# Patient Record
Sex: FEMALE
[De-identification: names, ages, dates, MRNs, and addresses within clinical notes are randomized per-mention and may not be internally consistent; named-entity substitution may affect disease eponyms.]

---

## 2021-02-09 ENCOUNTER — TELEPHONE (OUTPATIENT)
Dept: SCHEDULING | Age: 36
End: 2021-02-09

## 2021-02-11 ENCOUNTER — OFFICE VISIT (OUTPATIENT)
Dept: SPORTS MEDICINE | Age: 36
End: 2021-02-11

## 2021-02-11 VITALS
SYSTOLIC BLOOD PRESSURE: 116 MMHG | HEIGHT: 65 IN | DIASTOLIC BLOOD PRESSURE: 74 MMHG | WEIGHT: 199 LBS | HEART RATE: 70 BPM | BODY MASS INDEX: 33.15 KG/M2

## 2021-02-11 DIAGNOSIS — M25.562 CHRONIC PAIN OF LEFT KNEE: Primary | ICD-10-CM

## 2021-02-11 DIAGNOSIS — G89.29 CHRONIC PAIN OF LEFT KNEE: Primary | ICD-10-CM

## 2021-02-11 PROCEDURE — 99243 OFF/OP CNSLTJ NEW/EST LOW 30: CPT | Performed by: FAMILY MEDICINE

## 2021-02-11 PROCEDURE — 97110 THERAPEUTIC EXERCISES: CPT | Performed by: FAMILY MEDICINE

## 2021-02-11 ASSESSMENT — ENCOUNTER SYMPTOMS
FEVER: 0
CHILLS: 0
COLOR CHANGE: 0
WEAKNESS: 0
NUMBNESS: 0

## 2021-02-16 ENCOUNTER — E-ADVICE (OUTPATIENT)
Dept: SPORTS MEDICINE | Age: 36
End: 2021-02-16

## 2021-02-16 DIAGNOSIS — G89.29 CHRONIC PAIN OF LEFT KNEE: Primary | ICD-10-CM

## 2021-02-16 DIAGNOSIS — M25.562 CHRONIC PAIN OF LEFT KNEE: Primary | ICD-10-CM

## 2021-02-16 DIAGNOSIS — M22.2X2 PATELLOFEMORAL PAIN SYNDROME OF LEFT KNEE: ICD-10-CM

## 2023-04-03 PROBLEM — E78.2 MIXED HYPERLIPIDEMIA: Status: ACTIVE | Noted: 2023-04-03

## 2023-04-03 PROBLEM — K21.9 GASTROESOPHAGEAL REFLUX DISEASE WITHOUT ESOPHAGITIS: Status: ACTIVE | Noted: 2023-04-03

## 2023-04-03 PROBLEM — A60.00 HERPES SIMPLEX INFECTION OF GENITOURINARY SYSTEM: Status: ACTIVE | Noted: 2023-04-03

## 2023-04-03 PROBLEM — H10.13 ALLERGIC CONJUNCTIVITIS OF BOTH EYES: Status: ACTIVE | Noted: 2023-04-03

## 2023-04-20 PROBLEM — A60.00 GENITAL HERPES SIMPLEX: Status: ACTIVE | Noted: 2023-04-20

## 2023-04-20 PROBLEM — E78.2 MIXED HYPERLIPIDEMIA: Status: ACTIVE | Noted: 2023-04-20

## 2023-04-20 PROBLEM — H10.13 ALLERGIC CONJUNCTIVITIS OF BOTH EYES: Status: ACTIVE | Noted: 2023-04-20

## 2023-04-20 PROBLEM — G89.29 CHRONIC PAIN OF LEFT KNEE: Status: RESOLVED | Noted: 2021-02-11 | Resolved: 2023-04-20

## 2023-04-20 PROBLEM — M25.562 CHRONIC PAIN OF LEFT KNEE: Status: RESOLVED | Noted: 2021-02-11 | Resolved: 2023-04-20

## 2024-09-19 ENCOUNTER — LAB ENCOUNTER (OUTPATIENT)
Dept: LAB | Age: 39
End: 2024-09-19
Attending: FAMILY MEDICINE
Payer: COMMERCIAL

## 2024-09-19 ENCOUNTER — OFFICE VISIT (OUTPATIENT)
Facility: CLINIC | Age: 39
End: 2024-09-19
Payer: COMMERCIAL

## 2024-09-19 VITALS
HEIGHT: 65 IN | DIASTOLIC BLOOD PRESSURE: 76 MMHG | OXYGEN SATURATION: 98 % | BODY MASS INDEX: 34.16 KG/M2 | WEIGHT: 205 LBS | SYSTOLIC BLOOD PRESSURE: 118 MMHG | HEART RATE: 81 BPM

## 2024-09-19 DIAGNOSIS — R79.89 LOW VITAMIN D LEVEL: ICD-10-CM

## 2024-09-19 DIAGNOSIS — Z00.00 ENCOUNTER FOR GENERAL ADULT MEDICAL EXAMINATION W/O ABNORMAL FINDINGS: Primary | ICD-10-CM

## 2024-09-19 DIAGNOSIS — A60.04 HERPES SIMPLEX VULVOVAGINITIS: ICD-10-CM

## 2024-09-19 DIAGNOSIS — K21.9 GASTROESOPHAGEAL REFLUX DISEASE WITHOUT ESOPHAGITIS: ICD-10-CM

## 2024-09-19 DIAGNOSIS — R10.2 PELVIC PAIN: ICD-10-CM

## 2024-09-19 DIAGNOSIS — Z00.00 ENCOUNTER FOR GENERAL ADULT MEDICAL EXAMINATION W/O ABNORMAL FINDINGS: ICD-10-CM

## 2024-09-19 DIAGNOSIS — N83.201 CYSTS OF BOTH OVARIES: ICD-10-CM

## 2024-09-19 DIAGNOSIS — N83.202 CYSTS OF BOTH OVARIES: ICD-10-CM

## 2024-09-19 DIAGNOSIS — R21 RASH: ICD-10-CM

## 2024-09-19 DIAGNOSIS — Z23 NEED FOR VACCINATION: ICD-10-CM

## 2024-09-19 DIAGNOSIS — D75.89 MACROCYTOSIS: ICD-10-CM

## 2024-09-19 PROBLEM — A60.00 GENITAL HERPES SIMPLEX: Status: ACTIVE | Noted: 2023-04-20

## 2024-09-19 PROBLEM — H10.13 ALLERGIC CONJUNCTIVITIS OF BOTH EYES: Status: ACTIVE | Noted: 2023-04-03

## 2024-09-19 PROBLEM — E78.2 MIXED HYPERLIPIDEMIA: Status: RESOLVED | Noted: 2023-04-03 | Resolved: 2024-09-19

## 2024-09-19 PROBLEM — H10.13 ALLERGIC CONJUNCTIVITIS OF BOTH EYES: Status: RESOLVED | Noted: 2023-04-03 | Resolved: 2024-09-19

## 2024-09-19 PROBLEM — E78.2 MIXED HYPERLIPIDEMIA: Status: ACTIVE | Noted: 2023-04-03

## 2024-09-19 LAB
ALBUMIN SERPL-MCNC: 4.8 G/DL (ref 3.2–4.8)
ALBUMIN/GLOB SERPL: 1.5 {RATIO} (ref 1–2)
ALP LIVER SERPL-CCNC: 47 U/L
ALT SERPL-CCNC: 12 U/L
ANION GAP SERPL CALC-SCNC: 9 MMOL/L (ref 0–18)
AST SERPL-CCNC: 18 U/L (ref ?–34)
BASOPHILS # BLD AUTO: 0.04 X10(3) UL (ref 0–0.2)
BASOPHILS NFR BLD AUTO: 0.7 %
BILIRUB SERPL-MCNC: 0.4 MG/DL (ref 0.3–1.2)
BUN BLD-MCNC: 8 MG/DL (ref 9–23)
BUN/CREAT SERPL: 9.8 (ref 10–20)
CALCIUM BLD-MCNC: 9.7 MG/DL (ref 8.7–10.4)
CHLORIDE SERPL-SCNC: 106 MMOL/L (ref 98–112)
CHOLEST SERPL-MCNC: 249 MG/DL (ref ?–200)
CO2 SERPL-SCNC: 24 MMOL/L (ref 21–32)
CREAT BLD-MCNC: 0.82 MG/DL
DEPRECATED RDW RBC AUTO: 44.7 FL (ref 35.1–46.3)
EGFRCR SERPLBLD CKD-EPI 2021: 92 ML/MIN/1.73M2 (ref 60–?)
EOSINOPHIL # BLD AUTO: 0.17 X10(3) UL (ref 0–0.7)
EOSINOPHIL NFR BLD AUTO: 3 %
ERYTHROCYTE [DISTWIDTH] IN BLOOD BY AUTOMATED COUNT: 12 % (ref 11–15)
EST. AVERAGE GLUCOSE BLD GHB EST-MCNC: 94 MG/DL (ref 68–126)
FASTING PATIENT LIPID ANSWER: YES
FASTING STATUS PATIENT QL REPORTED: YES
GLOBULIN PLAS-MCNC: 3.2 G/DL (ref 2–3.5)
GLUCOSE BLD-MCNC: 82 MG/DL (ref 70–99)
HBA1C MFR BLD: 4.9 % (ref ?–5.7)
HCT VFR BLD AUTO: 42.2 %
HDLC SERPL-MCNC: 54 MG/DL (ref 40–59)
HGB BLD-MCNC: 14 G/DL
IMM GRANULOCYTES # BLD AUTO: 0 X10(3) UL (ref 0–1)
IMM GRANULOCYTES NFR BLD: 0 %
LDLC SERPL CALC-MCNC: 161 MG/DL (ref ?–100)
LYMPHOCYTES # BLD AUTO: 1.9 X10(3) UL (ref 1–4)
LYMPHOCYTES NFR BLD AUTO: 33.6 %
MCH RBC QN AUTO: 33.5 PG (ref 26–34)
MCHC RBC AUTO-ENTMCNC: 33.2 G/DL (ref 31–37)
MCV RBC AUTO: 101 FL
MONOCYTES # BLD AUTO: 0.4 X10(3) UL (ref 0.1–1)
MONOCYTES NFR BLD AUTO: 7.1 %
NEUTROPHILS # BLD AUTO: 3.14 X10 (3) UL (ref 1.5–7.7)
NEUTROPHILS # BLD AUTO: 3.14 X10(3) UL (ref 1.5–7.7)
NEUTROPHILS NFR BLD AUTO: 55.6 %
NONHDLC SERPL-MCNC: 195 MG/DL (ref ?–130)
OSMOLALITY SERPL CALC.SUM OF ELEC: 285 MOSM/KG (ref 275–295)
PLATELET # BLD AUTO: 351 10(3)UL (ref 150–450)
POTASSIUM SERPL-SCNC: 4.6 MMOL/L (ref 3.5–5.1)
PROT SERPL-MCNC: 8 G/DL (ref 5.7–8.2)
RBC # BLD AUTO: 4.18 X10(6)UL
SODIUM SERPL-SCNC: 139 MMOL/L (ref 136–145)
TRIGL SERPL-MCNC: 185 MG/DL (ref 30–149)
TSI SER-ACNC: 2.82 MIU/ML (ref 0.55–4.78)
VIT D+METAB SERPL-MCNC: 27.5 NG/ML (ref 30–100)
VLDLC SERPL CALC-MCNC: 36 MG/DL (ref 0–30)
WBC # BLD AUTO: 5.7 X10(3) UL (ref 4–11)

## 2024-09-19 PROCEDURE — 3008F BODY MASS INDEX DOCD: CPT | Performed by: FAMILY MEDICINE

## 2024-09-19 PROCEDURE — 82306 VITAMIN D 25 HYDROXY: CPT | Performed by: FAMILY MEDICINE

## 2024-09-19 PROCEDURE — 83036 HEMOGLOBIN GLYCOSYLATED A1C: CPT | Performed by: FAMILY MEDICINE

## 2024-09-19 PROCEDURE — 80061 LIPID PANEL: CPT | Performed by: FAMILY MEDICINE

## 2024-09-19 PROCEDURE — 3078F DIAST BP <80 MM HG: CPT | Performed by: FAMILY MEDICINE

## 2024-09-19 PROCEDURE — 99386 PREV VISIT NEW AGE 40-64: CPT | Performed by: FAMILY MEDICINE

## 2024-09-19 PROCEDURE — 99203 OFFICE O/P NEW LOW 30 MIN: CPT | Performed by: FAMILY MEDICINE

## 2024-09-19 PROCEDURE — 90715 TDAP VACCINE 7 YRS/> IM: CPT | Performed by: FAMILY MEDICINE

## 2024-09-19 PROCEDURE — 3074F SYST BP LT 130 MM HG: CPT | Performed by: FAMILY MEDICINE

## 2024-09-19 PROCEDURE — 90471 IMMUNIZATION ADMIN: CPT | Performed by: FAMILY MEDICINE

## 2024-09-19 PROCEDURE — 80050 GENERAL HEALTH PANEL: CPT | Performed by: FAMILY MEDICINE

## 2024-09-19 RX ORDER — LEVONORGESTREL / ETHINYL ESTRADIOL AND ETHINYL ESTRADIOL 150-30(84)
1 KIT ORAL EVERY 24 HOURS
COMMUNITY

## 2024-09-19 RX ORDER — PANTOPRAZOLE SODIUM 40 MG/1
40 TABLET, DELAYED RELEASE ORAL
COMMUNITY

## 2024-09-19 RX ORDER — VALACYCLOVIR HYDROCHLORIDE 500 MG/1
500 TABLET, FILM COATED ORAL DAILY
COMMUNITY

## 2024-09-19 RX ORDER — TRIAMCINOLONE ACETONIDE 1 MG/G
CREAM TOPICAL 2 TIMES DAILY PRN
Qty: 60 G | Refills: 0 | Status: SHIPPED | OUTPATIENT
Start: 2024-09-19

## 2024-09-19 NOTE — PROGRESS NOTES
Subjective:   Madalyn Dejesus is a 39 year old female who presents for Establish Care and Physical     Hx of vit D def. Patient was on RX strength vitamin D for about a month. Is now supplementing with OTC vit D. Patient does do resistance training.     Hx of of genital HSV on valtrex 500 mg daily for suppression. Periods are the major trigger for outbreaks. Since is going to be starting continuous OCPs to suppress period. Is wondering if can come off valtrex.     GERD on pantoprazole as needed. Rare use. Saw GI who did EGD in 2023 which was normal.     Rash  Has had on right side of abdomen for the past month. Slightly itchy    Right sided lower abdominal pain  Did have US about 2 years ago which demonstrated some ovarian cysts. Since then patient will have intermittent sharp pain that radiates from that area up to rest of thorax if moves in a certain way. More often triggered by laying down.     Follows with ob/gyn for pap smears. Is doing continuous OCPs    History/Other:    Chief Complaint Reviewed and Verified  No Further Nursing Notes to   Review  Tobacco Reviewed  Allergies Reviewed  Medications Reviewed    Problem List Reviewed  Medical History Reviewed  Surgical History   Reviewed  OB Status Reviewed  Family History Reviewed  Social History   Reviewed         Tobacco:  She has never smoked tobacco.    Current Outpatient Medications   Medication Sig Dispense Refill    pantoprazole 40 MG Oral Tab EC Take 1 tablet (40 mg total) by mouth daily as needed (for GERD symptoms).      triamcinolone 0.1 % External Cream Apply topically 2 (two) times daily as needed. 60 g 0    Levonorgest-Eth Estrad 91-Day 0.15-0.03 &0.01 MG Oral Tab Take 1 tablet by mouth daily.      valACYclovir 500 MG Oral Tab Take 1 tablet (500 mg total) by mouth daily.           Review of Systems:  Review of Systems   Constitutional: Negative.    HENT: Negative.     Eyes: Negative.    Respiratory: Negative.     Cardiovascular:  Negative.    Gastrointestinal: Negative.    Genitourinary:  Positive for pelvic pain (right sided).   Musculoskeletal: Negative.    Skin:  Positive for rash.   Neurological: Negative.    Psychiatric/Behavioral: Negative.         Objective:   /76   Pulse 81   Ht 5' 5\" (1.651 m)   Wt 205 lb (93 kg)   LMP  (Approximate)   SpO2 98%   BMI 34.11 kg/m²  Estimated body mass index is 34.11 kg/m² as calculated from the following:    Height as of this encounter: 5' 5\" (1.651 m).    Weight as of this encounter: 205 lb (93 kg).  Physical Exam  Vitals and nursing note reviewed.   Constitutional:       General: She is not in acute distress.     Appearance: Normal appearance. She is not ill-appearing.   HENT:      Head: Normocephalic and atraumatic.      Right Ear: Tympanic membrane normal. There is no impacted cerumen.      Left Ear: Tympanic membrane normal. There is no impacted cerumen.      Mouth/Throat:      Mouth: Mucous membranes are moist.      Pharynx: Oropharynx is clear. No oropharyngeal exudate or posterior oropharyngeal erythema.   Eyes:      General:         Right eye: No discharge.         Left eye: No discharge.      Extraocular Movements: Extraocular movements intact.      Pupils: Pupils are equal, round, and reactive to light.   Cardiovascular:      Rate and Rhythm: Normal rate and regular rhythm.      Heart sounds: Normal heart sounds. No murmur heard.     No friction rub. No gallop.   Pulmonary:      Effort: Pulmonary effort is normal.      Breath sounds: Normal breath sounds. No wheezing, rhonchi or rales.   Abdominal:      General: Abdomen is flat. Bowel sounds are normal. There is no distension.      Palpations: Abdomen is soft. There is no mass.      Tenderness: There is no abdominal tenderness. There is no guarding or rebound.   Musculoskeletal:         General: Normal range of motion.      Cervical back: Normal range of motion.      Right lower leg: No edema.      Left lower leg: No edema.    Skin:     General: Skin is warm and dry.      Findings: Rash (erythematous patch on right side of abdomen) present.   Neurological:      General: No focal deficit present.      Mental Status: She is alert. Mental status is at baseline.   Psychiatric:         Mood and Affect: Mood normal.         Behavior: Behavior normal.         Assessment & Plan:   1. Encounter for general adult medical examination w/o abnormal findings (Primary)  -     CBC With Differential With Platelet; Future; Expected date: 09/19/2024  -     Comp Metabolic Panel (14); Future; Expected date: 09/19/2024  -     Hemoglobin A1C; Future; Expected date: 09/19/2024  -     Lipid Panel; Future; Expected date: 09/19/2024  -     TSH W Reflex To Free T4; Future; Expected date: 09/19/2024    -ordered annual labs as listed. Pap and mammo up to date. Follows with ob/gyn    2. Low vitamin D level  -     Vitamin D; Future; Expected date: 09/19/2024    -ordered updated level    3. Gastroesophageal reflux disease without esophagitis  -doing well on pantoprazole as needed    4. Herpes simplex vulvovaginitis  Counseled patient can trial off of valtrex. If notes increased outbreaks to restart suppressive dosing    5. Need for vaccination  -     TdaP (Boostrix) Vaccine (> 7 Y)    -due administered    6. Rash  -     Triamcinolone Acetonide; Apply topically 2 (two) times daily as needed.  Dispense: 60 g; Refill: 0    -will trial kenalog cream as written    7. Pelvic pain  8. Cysts of both ovaries  -     US PELVIS (TRANSABDOMINAL AND TRANSVAGINAL) (CPT=76856/49271); Future; Expected date: 09/19/2024    -ordered US to assess further given history of ovarian cysts. Staff faxed order to [facility]    Return in about 1 year (around 9/19/2025), or if symptoms worsen or fail to improve.    Latisha Coto MD, 9/19/2024, 11:11 AM

## 2024-10-01 LAB
FOLATE, SERUM: 9.7 NG/ML
VITAMIN B12: 1514 PG/ML (ref 200–1100)

## 2024-10-28 ENCOUNTER — TELEPHONE (OUTPATIENT)
Facility: CLINIC | Age: 39
End: 2024-10-28

## 2024-10-28 DIAGNOSIS — Z12.31 BREAST CANCER SCREENING BY MAMMOGRAM: Primary | ICD-10-CM

## 2024-10-28 NOTE — TELEPHONE ENCOUNTER
Mammogram pended  Please review and sign if appropriate    Last mammogram done at [facility] on 3/24/2024

## 2024-10-28 NOTE — TELEPHONE ENCOUNTER
[employer] message sent to patient, order is ready   Prescription resent to patient pharmacy as per MD order

## 2024-12-30 ENCOUNTER — HOSPITAL ENCOUNTER (OUTPATIENT)
Dept: MAMMOGRAPHY | Age: 39
Discharge: HOME | End: 2024-12-30
Attending: FAMILY MEDICINE
Payer: COMMERCIAL

## 2024-12-30 DIAGNOSIS — Z12.31 BREAST CANCER SCREENING BY MAMMOGRAM: ICD-10-CM

## 2024-12-30 PROBLEM — R92.30 DENSE BREAST: Status: ACTIVE | Noted: 2024-12-30

## 2024-12-30 PROCEDURE — 77067 SCR MAMMO BI INCL CAD: CPT | Performed by: FAMILY MEDICINE

## 2024-12-30 PROCEDURE — 77063 BREAST TOMOSYNTHESIS BI: CPT | Performed by: FAMILY MEDICINE

## 2025-03-07 ENCOUNTER — NURSE TRIAGE (OUTPATIENT)
Facility: CLINIC | Age: 40
End: 2025-03-07

## 2025-03-07 ENCOUNTER — OFFICE VISIT (OUTPATIENT)
Facility: CLINIC | Age: 40
End: 2025-03-07
Payer: COMMERCIAL

## 2025-03-07 VITALS
WEIGHT: 209 LBS | OXYGEN SATURATION: 95 % | BODY MASS INDEX: 34.82 KG/M2 | SYSTOLIC BLOOD PRESSURE: 130 MMHG | HEART RATE: 70 BPM | DIASTOLIC BLOOD PRESSURE: 72 MMHG | HEIGHT: 65 IN

## 2025-03-07 DIAGNOSIS — K21.9 GASTROESOPHAGEAL REFLUX DISEASE WITHOUT ESOPHAGITIS: Primary | ICD-10-CM

## 2025-03-07 DIAGNOSIS — M77.8 TENDINITIS OF RIGHT SHOULDER: ICD-10-CM

## 2025-03-07 PROCEDURE — 3008F BODY MASS INDEX DOCD: CPT | Performed by: FAMILY MEDICINE

## 2025-03-07 PROCEDURE — 3075F SYST BP GE 130 - 139MM HG: CPT | Performed by: FAMILY MEDICINE

## 2025-03-07 PROCEDURE — 3078F DIAST BP <80 MM HG: CPT | Performed by: FAMILY MEDICINE

## 2025-03-07 PROCEDURE — 99214 OFFICE O/P EST MOD 30 MIN: CPT | Performed by: FAMILY MEDICINE

## 2025-03-07 RX ORDER — IBUPROFEN 800 MG/1
800 TABLET, FILM COATED ORAL 2 TIMES DAILY
Qty: 28 TABLET | Refills: 0 | Status: SHIPPED | OUTPATIENT
Start: 2025-03-07 | End: 2025-03-21

## 2025-03-07 RX ORDER — PANTOPRAZOLE SODIUM 40 MG/1
40 TABLET, DELAYED RELEASE ORAL
Qty: 90 TABLET | Refills: 1 | Status: SHIPPED | OUTPATIENT
Start: 2025-03-07

## 2025-03-07 NOTE — PROGRESS NOTES
Subjective:   Madalyn Dejesus is a 39 year old female who presents for Follow - Up (Right arm pain since 3/6/2025)     Patient presents for right shoulder pain. First started 2-3 weeks ago. Intially felt like soreness. Resolved on its own after 3-4 days. Then yesterday had to hit trunk to get it to open and hit right elbow. Was carrying bags into home. No immediate pain or popping sensation. Then later in the day soreness feeling began and intensified. Laying on arm is painful. Taking off shirts painful. Putting on jacket painful. Reaching up painful. If held in a neutral position at side not pain. Patient does have chronic neck tightness. 2 months ago did sleep funny and has had right sided neck tension. Is getting therapudic massage.     GERD  Patient doing well on as needed pantoprazole.  Needs refill    History/Other:    Chief Complaint Reviewed and Verified  Nursing Notes Reviewed and   Verified  Tobacco Reviewed  Allergies Reviewed  Medications Reviewed    Problem List Reviewed  Medical History Reviewed  Surgical History   Reviewed  OB Status Reviewed  Family History Reviewed  Social History   Reviewed         Tobacco:  She has never smoked tobacco.    Current Outpatient Medications   Medication Sig Dispense Refill    pantoprazole 40 MG Oral Tab EC Take 1 tablet (40 mg total) by mouth daily as needed (for GERD symptoms). 90 tablet 1    ibuprofen 800 MG Oral Tab Take 1 tablet (800 mg total) by mouth in the morning and 1 tablet (800 mg total) before bedtime. Do all this for 14 days. 28 tablet 0    Levonorgest-Eth Estrad 91-Day 0.15-0.03 &0.01 MG Oral Tab Take 1 tablet by mouth daily.      valACYclovir 500 MG Oral Tab Take 1 tablet (500 mg total) by mouth daily.           Review of Systems:  Review of Systems   Constitutional: Negative.    Respiratory: Negative.     Cardiovascular: Negative.    Gastrointestinal: Negative.    Musculoskeletal:  Positive for arthralgias (right shoulder pain).   Skin:  Negative.    Neurological: Negative.          Objective:   /72   Pulse 70   Ht 5' 5\" (1.651 m)   Wt 209 lb (94.8 kg)   SpO2 95%   BMI 34.78 kg/m²  Estimated body mass index is 34.78 kg/m² as calculated from the following:    Height as of this encounter: 5' 5\" (1.651 m).    Weight as of this encounter: 209 lb (94.8 kg).  Physical Exam  Vitals and nursing note reviewed.   Constitutional:       General: She is not in acute distress.     Appearance: Normal appearance.   HENT:      Head: Normocephalic and atraumatic.   Eyes:      General:         Right eye: No discharge.         Left eye: No discharge.      Comments: Extraocular eye movements grossly intact   Pulmonary:      Effort: Pulmonary effort is normal.   Musculoskeletal:      Comments: Normal gait    Bilateral shoulders:  Forward flexion and adduction ROM intact. Though painful during first 30 degrees of abduction. For forward flexion first 10 degrees painful but able to continue to lift arm through full range of motion.   No tenderness or deformity to clavicle, humeral head or scapular spine  Negative sulcus sign    Right Shoulder:  positive Empty can test  Negative pain with resisted external rotation  Negative Bedoya    Left shoulder:  Negative Empty can test  Negative pain with resisted external rotation  Negative Bedoya    Skin:     Findings: No rash.   Neurological:      General: No focal deficit present.      Mental Status: She is alert. Mental status is at baseline.   Psychiatric:         Mood and Affect: Mood normal.         Behavior: Behavior normal.         Assessment & Plan:   1. Gastroesophageal reflux disease without esophagitis (Primary)  -     Pantoprazole Sodium; Take 1 tablet (40 mg total) by mouth daily as needed (for GERD symptoms).  Dispense: 90 tablet; Refill: 1    Stable doing well refilled.    2. Tendinitis of right shoulder  -     Ibuprofen; Take 1 tablet (800 mg total) by mouth in the morning and 1 tablet (800 mg total)  before bedtime. Do all this for 14 days.  Dispense: 28 tablet; Refill: 0    Low Suspicion for tear of rotator cuff.  Likely tendinitis.  Provided patient stretches to do and will trial ibuprofen as written.  If ibuprofen not helpful we will send in prednisone.  Patient may message provider via [employer] if feels like needs prednisone.       Return in about 6 months (around 9/7/2025) for Annual.    Latisha Coto MD, 3/7/2025, 10:12 AM

## 2025-03-07 NOTE — TELEPHONE ENCOUNTER
Action Requested: Summary for Provider     []  Critical Lab, Recommendations Needed  [] Need Additional Advice  [x]   FYI    []   Need Orders  [] Need Medications Sent to Pharmacy  []  Other     SUMMARY: Per protocol, patient should be seen in the office today or tomorrow. Appointment scheduled.    Dr. Coto patient scheduled on SDA at 10:00 today.    Future Appointments   Date Time Provider Department Center   3/7/2025 10:00 AM Latisha Coto MD [facility]     Reason for call: Arm Pain  Onset: 3/6     Patient reports of right arm pain (upper middle arm) last night. Pain came out of nowhere, denies injury. She had her usual day at work yesterday, long drive home and carried her bags (same weight of bags as before). She did not notice the pain until dinner. She had similar pain 3-4 weeks ago but not as intense. States overhead movements such as putting her shirt or coat on aggravates the pain. She does not feel the pain as much when at rest or not moving (2/10 only) but states arm just not feel normal.     Denies neck pain but states neck generally feels tight. Denies numbness and weakness to the arm, hand or fingers. Care advice given per protocol. Patient verbalized understanding and agreed with plan of care.     Reason for Disposition   Patient wants to be seen    Protocols used: Arm Pain-A-[facility]

## 2025-04-16 ENCOUNTER — NURSE TRIAGE (OUTPATIENT)
Facility: CLINIC | Age: 40
End: 2025-04-16

## 2025-04-16 NOTE — TELEPHONE ENCOUNTER
Action Requested: Summary for Provider     []  Critical Lab, Recommendations Needed  [x] Need Additional Advice  []   FYI    []   Need Orders  [] Need Medications Sent to Pharmacy  []  Other     SUMMARY: states she has been having joint pain in her shoulders hips and also throat oain. It has been moving around and she would like to know what is causing this. Advised an appointment, she was agreeable. Need approval to book in SDA slot.     Reason for call: Shoulder Pain  Onset: several weeks    The patient has been complaining of shoulder pain and she has seen Dr. Coto for this before and was prescribed ibuprofen for this. She was told it was tendonitis but she states now the pain is moving as follows:  March 23rd got a sore throat, April 1 st right shoulder started ot hurt again, on April 12th her right hip was having very bad pain all day. And yesterday she developed a sore throat and she had pain in her left shoulder. She is not having any chest pain or shortness of breath. She states sometimes her shoulders have felt hot when they are hurting, no swelling. She states the pain is constant but it is worse with movements.     Advised she should be seen for this again in the office. She was agreeable. Dr Coto please advise, no open appointments I could book her in. She is mostly available on Mondays and Fridays as she is in her busy season, ok to book SDA slot? Thank you.     Reason for Disposition   MILD pain (e.g., does not interfere with normal activities) and present > 7 days    Protocols used: Shoulder Pain-A-[facility]

## 2025-04-17 NOTE — TELEPHONE ENCOUNTER
Can keep as video visit.  I would recommend patient questions to dermatology regarding minoxidil    Latisha Coto MD, 04/17/25, 12:01 PM

## 2025-04-17 NOTE — TELEPHONE ENCOUNTER
Dr. Coto - earliest SDA 5/1/25 - okay to leave this as telemedicine, or should patient come into the office - patient agreeable, if needed.     Patient also has question about oral menoxidil, prescribed yesterday by dermatology for hair loss.     Spoke to patient, full name and date of birth verified.  Patient agreeable to schedule in first SDA slot on 5/1/25 at 6:30 PM.   Appointment on 5/5/25 cancelled.     Patient would like to ask Dr. Coto's advise.   Previous discussions about hair loss, recommended dermatology, who patient saw yesterday.     Low dose oral minoxidil was prescribed, patient is to take 0.25 tablet for 2 weeks, then increase to 0.5 tablet thereafter.     Patient did not start this yet, she is concerned it will make her joint pain worse.   Patient is asking if Dr. Coto feels it is okay to start the menoxidil, or should patient wait until after the appointment on 5/1/25.

## 2025-04-24 ENCOUNTER — PATIENT MESSAGE (OUTPATIENT)
Facility: CLINIC | Age: 40
End: 2025-04-24

## 2025-04-24 DIAGNOSIS — M06.9 RHEUMATOID ARTHRITIS, INVOLVING UNSPECIFIED SITE, UNSPECIFIED WHETHER RHEUMATOID FACTOR PRESENT (HCC): Primary | ICD-10-CM

## 2025-05-01 ENCOUNTER — TELEMEDICINE (OUTPATIENT)
Facility: CLINIC | Age: 40
End: 2025-05-01
Payer: COMMERCIAL

## 2025-05-01 DIAGNOSIS — M25.50 POLYARTHRALGIA: Primary | ICD-10-CM

## 2025-05-01 PROCEDURE — 98005 SYNCH AUDIO-VIDEO EST LOW 20: CPT | Performed by: FAMILY MEDICINE

## 2025-05-01 NOTE — PROGRESS NOTES
CC:  No chief complaint on file.      HPI: Patient presenting for video visit. Has seen rheumatology for these joint pains on 4/18/25. Patient has had migrating joint pains associated with intermittent sore throats. They are concerned for adult onset stills disease which is a rheumatologic disorder that causes joint pain fevers rash versus perioral arthritis.  They recommended ibuprofen as needed and they ordered labs. Labs notable for positive inflammatory markers. Patient continues to have these migrating arthralgias. Pain does respond well to advil. Has follow up with Rheumatology on 5/12/2025. Patient does have family history of some autoimmune disease, RA on father's side and sjogren's on mother's side.     ROS:  General:  No fever, no fatigue, no weight changes  HEENT:  Denies congestion or nasal discharge  Cardio:  No chest pain  Pulmonary:  No cough, no SOB  GI:  No N/V/D  :  No discharge, no dysuria, no polyuria, no hematuria  Dermatologic:  No rashes  Musculoskeletal: +polyarthralgias    Past Medical History[1]    Social History     Socioeconomic History    Marital status: Single     Spouse name: Not on file    Number of children: Not on file    Years of education: Not on file    Highest education level: Not on file   Occupational History    Not on file   Tobacco Use    Smoking status: Never    Smokeless tobacco: Never   Vaping Use    Vaping status: Never Used   Substance and Sexual Activity    Alcohol use: Yes     Alcohol/week: 8.0 standard drinks of alcohol     Types: 4 Glasses of wine, 4 Standard drinks or equivalent per week    Drug use: Never    Sexual activity: Not on file   Other Topics Concern    Caffeine Concern No    Exercise Yes    Seat Belt Yes    Special Diet No    Stress Concern No    Weight Concern No   Social History Narrative    Not on file     Social Drivers of Health     Food Insecurity: Not on file   Transportation Needs: Not on file   Stress: Not on file   Housing Stability: Low Risk   (10/15/2023)    Received from [facility]    Housing Stability     Mortgage Payment Concerns?: Not on file     Number of Places Lived in the Last Year: Not on file     Unstable Housing?: Not on file       Current Medications[2]    [place] grass; Dust mite extract; Gramineae pollens; Uncaria tomentosa, cats claw; Dust mites; and Grass      Vitals: There were no vitals filed for this visit.      Physical:  General:  Alert, appropriate, no acute distress, A&O x 3  Pulmonary:  Speaking in clear and full sentences, no dyspnea   Psych: normal mood and affect     Assessment and Plan:     1. Polyarthralgia  New diagnosis for patient.  Actual formal diagnosis is still pending.  Patient has follow-up with rheumatologist May 12.  Answered all patient's questions to the best of provider ability.    Follow-up in about 4 months for annual physical    Please note that the following visit was completed using two-way, real-time interactive audio and video communication. This has been done in good juani to provide continuity of care in the best interest of the provider-patient relationship, due to the ongoing public health crisis/national emergency and because of restrictions of visitation. There are limitations of this visit as no physical exam could be performed. Every conscious effort was taken to allow for sufficient and adequate time. This billing was spent on reviewing labs, medications, radiology tests and decision making. Appropriate medical decision-making and tests are ordered as detailed in the plan of care above.      Latisha Coto MD  05/01/25  6:22 PM         [1]   Past Medical History:   Allergic rhinitis    Esophageal reflux    Obesity   [2]   Current Outpatient Medications   Medication Sig Dispense Refill    pantoprazole 40 MG Oral Tab EC Take 1 tablet (40 mg total) by mouth daily as needed (for GERD symptoms). 90 tablet 1    Levonorgest-Eth Estrad 91-Day 0.15-0.03 &0.01 MG Oral Tab Take 1 tablet  by mouth daily.      valACYclovir 500 MG Oral Tab Take 1 tablet (500 mg total) by mouth daily.

## 2025-08-18 ENCOUNTER — PATIENT MESSAGE (OUTPATIENT)
Facility: CLINIC | Age: 40
End: 2025-08-18